# Patient Record
Sex: MALE | Race: WHITE | HISPANIC OR LATINO | Employment: FULL TIME | URBAN - METROPOLITAN AREA
[De-identification: names, ages, dates, MRNs, and addresses within clinical notes are randomized per-mention and may not be internally consistent; named-entity substitution may affect disease eponyms.]

---

## 2019-06-15 ENCOUNTER — HOSPITAL ENCOUNTER (EMERGENCY)
Facility: HOSPITAL | Age: 29
Discharge: HOME | End: 2019-06-15
Attending: EMERGENCY MEDICINE
Payer: COMMERCIAL

## 2019-06-15 VITALS
HEART RATE: 73 BPM | SYSTOLIC BLOOD PRESSURE: 125 MMHG | HEIGHT: 71 IN | TEMPERATURE: 98 F | RESPIRATION RATE: 18 BRPM | DIASTOLIC BLOOD PRESSURE: 69 MMHG | BODY MASS INDEX: 44.1 KG/M2 | OXYGEN SATURATION: 98 % | WEIGHT: 315 LBS

## 2019-06-15 DIAGNOSIS — R07.89 CHEST PAIN, NON-CARDIAC: Primary | ICD-10-CM

## 2019-06-15 DIAGNOSIS — R07.9 CHEST PAIN: ICD-10-CM

## 2019-06-15 PROCEDURE — 25000003 PHARM REV CODE 250: Performed by: EMERGENCY MEDICINE

## 2019-06-15 PROCEDURE — 99284 EMERGENCY DEPT VISIT MOD MDM: CPT | Mod: 25

## 2019-06-15 PROCEDURE — 93010 ELECTROCARDIOGRAM REPORT: CPT | Mod: ,,, | Performed by: INTERNAL MEDICINE

## 2019-06-15 PROCEDURE — 93010 EKG 12-LEAD: ICD-10-PCS | Mod: ,,, | Performed by: INTERNAL MEDICINE

## 2019-06-15 PROCEDURE — 93005 ELECTROCARDIOGRAM TRACING: CPT

## 2019-06-15 RX ORDER — IBUPROFEN 600 MG/1
600 TABLET ORAL
Status: COMPLETED | OUTPATIENT
Start: 2019-06-15 | End: 2019-06-15

## 2019-06-15 RX ORDER — IBUPROFEN 600 MG/1
600 TABLET ORAL EVERY 6 HOURS PRN
Qty: 20 TABLET | Refills: 0 | Status: SHIPPED | OUTPATIENT
Start: 2019-06-15

## 2019-06-15 RX ADMIN — IBUPROFEN 600 MG: 600 TABLET ORAL at 09:06

## 2019-06-15 NOTE — DISCHARGE INSTRUCTIONS
Please return immediately if you get worse or if new problems develop.  Ibuprofen for pain. Please follow-up with primary care this week.  Rest.  Heating pad.

## 2019-06-15 NOTE — ED TRIAGE NOTES
"Pt comes to ED today with complaints of chest pain, starting around 0300.  Pt reports a sudden onset, pain feeling like "aching," or "stabbing."  Pt states that every time he moves his neck or arm, he can feel the pain more.  Pain is midsternal, to right side.  Pt denies nausea, vomiting and shortness of breath at this time.  VSS, he appears to be in no acute distress.    "

## 2019-06-15 NOTE — ED PROVIDER NOTES
Encounter Date: 6/15/2019    SCRIBE #1 NOTE: I, Wilfredo Lozano, am scribing for, and in the presence of,  Kunal Norman MD. I have scribed the following portions of the note - Other sections scribed: HPI, ROS.       History     Chief Complaint   Patient presents with    Chest Pain     pt reports right sided chest pain that worsens when moving arm or neck; pt denies radiation of pain.     CC: Chest Pain    HPI: This is a 29 y.o. M who has Carpal tunnel syndrome who presents to the ED for emergent evaluation of acute right sided CP that began yesterday morning. Pt has no associated symptoms. Chest pain is exacerbated with right arm movement and head movement. Pt denies CP being worse with taking deep breaths. He also reports tingling sensation in both hands that he attributes to carpal tunnel syndrome. No known drug allergies. Pt denies fever, chills, diaphoresis, ear pain, sore throat, cough, SOB, abdominal pain, nausea, vomiting, diarrhea, dysuria, back pain, arm or leg problems, rash, headache, tobacco use, alcohol use, surgical history, or Hx of blood clots.    The history is provided by the patient. No  was used.     Review of patient's allergies indicates:  No Known Allergies  Past Medical History:   Diagnosis Date    Carpal tunnel syndrome      History reviewed. No pertinent surgical history.  History reviewed. No pertinent family history.  Social History     Tobacco Use    Smoking status: Never Smoker    Smokeless tobacco: Never Used   Substance Use Topics    Alcohol use: Not Currently    Drug use: Never     Review of Systems   Constitutional: Negative for chills, diaphoresis and fever.   HENT: Negative for ear pain and sore throat.    Eyes: Negative for pain.   Respiratory: Negative for cough and shortness of breath.    Cardiovascular: Positive for chest pain.   Gastrointestinal: Negative for abdominal pain, diarrhea, nausea and vomiting.   Genitourinary: Negative for dysuria.    Musculoskeletal: Negative for back pain.        (-) arm or leg problems   Skin: Negative for rash.   Neurological: Negative for headaches.        (+) Tingling in both hands       Physical Exam     Initial Vitals [06/15/19 0845]   BP Pulse Resp Temp SpO2   (!) 147/75 76 17 98 °F (36.7 °C) 98 %      MAP       --         Physical Exam  The patient was examined specifically for the following:   General:No significant distress, Good color, Warm and dry. Head and neck:Scalp atraumatic, Neck supple. Neurological:Appropriate conversation, Gross motor deficits. Eyes:Conjugate gaze, Clear corneas. ENT: No epistaxis. Cardiac: Regular rate and rhythm, Grossly normal heart tones. Pulmonary: Wheezing, Rales. Gastrointestinal: Abdominal tenderness, Abdominal distention. Musculoskeletal: Extremity deformity, Apparent pain with range of motion of the joints. Skin: Rash.   The findings on examination were normal except for the following:  The patient has pain with posterior distraction of the right arm in his right pectoral chest.  The chest is nontender.  Lungs are clear.  The heart tones are normal.  The abdomen is soft.  Patient is not tachycardic.  ED Course   Procedures  Labs Reviewed - No data to display  EKG Readings: (Independently Interpreted)   This patient is in a normal sinus rhythm with a heart rate of 75.  The p.r. QRS and QT intervals are normal.  There is no definite evidence of acute myocardial infarction or malignant arrhythmia.  This is a normal EKG.       Imaging Results          X-Ray Chest AP Portable (Final result)  Result time 06/15/19 09:26:31    Final result by Parrish Munoz DO (06/15/19 09:26:31)                 Impression:      No acute cardiopulmonary process.      Electronically signed by: Parrish Munoz DO  Date:    06/15/2019  Time:    09:26             Narrative:    EXAMINATION:  XR CHEST AP PORTABLE    CLINICAL HISTORY:  chest pain;    TECHNIQUE:  Single frontal view of the chest was  performed.    COMPARISON:  None    FINDINGS:  No infiltrates or pleural effusions are identified.  The heart does not appear to be enlarged for a portable exam.                              Medical decision making:  Given the above this patient has right-sided chest pain is worse with right arm movement.  I believe this is musculoskeletal pain.  Chest x-ray fails to reveal pneumothorax pneumonia pleural effusion.  EKG is negative for signs of ischemia pericarditis myocarditis.  Will discharge the patient on ibuprofen to follow up with primary care.  I considered pulmonary embolus with the patient is not short of breath or tachycardic.  He has no leg swelling or tenderness.                Scribe Attestation:   Scribe #1: I performed the above scribed service and the documentation accurately describes the services I performed. I attest to the accuracy of the note.    Attending Attestation:           Physician Attestation for Scribe:  Physician Attestation Statement for Scribe #1: I, Kunal Norman MD, reviewed documentation, as scribed by Wiflredo Lozano in my presence, and it is both accurate and complete.                    Clinical Impression:       ICD-10-CM ICD-9-CM   1. Chest pain, non-cardiac R07.89 786.59   2. Chest pain R07.9 786.50                                Kunal Norman MD  06/15/19 0937